# Patient Record
Sex: MALE | Race: WHITE | Employment: FULL TIME | URBAN - METROPOLITAN AREA
[De-identification: names, ages, dates, MRNs, and addresses within clinical notes are randomized per-mention and may not be internally consistent; named-entity substitution may affect disease eponyms.]

---

## 2017-07-13 ENCOUNTER — HOSPITAL ENCOUNTER (EMERGENCY)
Age: 22
Discharge: HOME | End: 2017-07-13
Attending: EMERGENCY MEDICINE | Admitting: EMERGENCY MEDICINE
Payer: SELF-PAY

## 2017-07-13 VITALS
HEART RATE: 96 BPM | SYSTOLIC BLOOD PRESSURE: 131 MMHG | TEMPERATURE: 98.7 F | WEIGHT: 185 LBS | DIASTOLIC BLOOD PRESSURE: 81 MMHG | BODY MASS INDEX: 25.06 KG/M2 | RESPIRATION RATE: 23 BRPM | HEIGHT: 72 IN | OXYGEN SATURATION: 99 %

## 2017-07-13 DIAGNOSIS — F41.0 PANIC ATTACK: Primary | ICD-10-CM

## 2017-07-13 PROCEDURE — 99285 EMERGENCY DEPT VISIT HI MDM: CPT

## 2017-07-13 PROCEDURE — 93005 ELECTROCARDIOGRAM TRACING: CPT

## 2017-07-13 NOTE — ED PROVIDER NOTES
HPI Comments: 6:05 PM Jose J Garces is a 22 y.o. male with a hx of panic attacks who presents to the ED via EMS for evaluation of an anxiety attack that occurred today. The pt is c/o of hyperventilation and CP. He explains that he had a disagreement with his wife that triggered his attack. He states that he has never talked to a psychiatrist or counselor about his anxiety before. He notes that his anxiety leads to further disagreements and that he would like help; the pt has anxiety attacks weekly. The pt denies exertional CP, current SOB and CP, SI, HI, hallucinations, abd pain and h/o bipolar disorder. No other complaints or concerns voiced at this time. PCP:  None      The history is provided by the patient. Past Medical History:   Diagnosis Date    Panic attack        History reviewed. No pertinent surgical history. History reviewed. No pertinent family history. Social History     Social History    Marital status:      Spouse name: N/A    Number of children: N/A    Years of education: N/A     Occupational History    Not on file. Social History Main Topics    Smoking status: Current Some Day Smoker    Smokeless tobacco: Never Used      Comment: patient states once in a while he will have 1 cigarette    Alcohol use Yes      Comment: occasionally    Drug use: No    Sexual activity: Yes     Other Topics Concern    Not on file     Social History Narrative    No narrative on file         ALLERGIES: Review of patient's allergies indicates no known allergies. Review of Systems   Constitutional: Negative for diaphoresis and fever. HENT: Negative for ear pain, rhinorrhea and trouble swallowing. Eyes: Negative for visual disturbance. Respiratory: Positive for shortness of breath. Negative for cough. Cardiovascular: Positive for chest pain. Negative for leg swelling. Gastrointestinal: Negative for abdominal pain, blood in stool, diarrhea, nausea and vomiting. Genitourinary: Negative for difficulty urinating, flank pain and hematuria. Musculoskeletal: Negative for back pain and neck pain. Skin: Negative for rash. Neurological: Negative for dizziness, weakness, numbness and headaches. Hematological: Negative. Psychiatric/Behavioral: Negative. Negative for hallucinations and suicidal ideas. All other systems reviewed and are negative. Vitals:    07/13/17 1740 07/13/17 1750 07/13/17 1810 07/13/17 1820   BP: 140/68 146/69 131/73 131/81   Pulse: (!) 104 98 95 96   Resp: 18 18 24 23   Temp:       SpO2: 99% 100% 100% 99%   Weight:       Height:                Physical Exam   Constitutional: He is oriented to person, place, and time. He appears well-developed and well-nourished. No distress. HENT:   Head: Normocephalic and atraumatic. Right Ear: External ear normal.   Left Ear: External ear normal.   Nose: Nose normal.   Mouth/Throat: Oropharynx is clear and moist.   Eyes: Conjunctivae and EOM are normal. Pupils are equal, round, and reactive to light. No scleral icterus. Neck: Normal range of motion. Neck supple. No JVD present. No tracheal deviation present. No thyromegaly present. Cardiovascular: Normal rate, regular rhythm, normal heart sounds and intact distal pulses. Exam reveals no gallop and no friction rub. No murmur heard. Pulmonary/Chest: Effort normal and breath sounds normal. He exhibits no tenderness. Abdominal: Soft. Bowel sounds are normal. He exhibits no distension. There is no tenderness. There is no rebound and no guarding. Musculoskeletal: Normal range of motion. He exhibits no edema or tenderness. Lymphadenopathy:     He has no cervical adenopathy. Neurological: He is alert and oriented to person, place, and time. No cranial nerve deficit. Coordination normal.   No sensory loss, Gait normal, Motor 5/5   Skin: Skin is warm and dry. Psychiatric: He has a normal mood and affect.  His behavior is normal. Judgment and thought content normal.   Nursing note and vitals reviewed. MDM  Number of Diagnoses or Management Options  Panic attack:   Diagnosis management comments: Pt w/ panic attack, non exertional symptoms. Pt needs out patient follow up. No SI, HI, or AH. ED Course       Procedures    Vitals:  Patient Vitals for the past 12 hrs:   Temp Pulse Resp BP SpO2   07/13/17 1820 - 96 23 131/81 99 %   07/13/17 1810 - 95 24 131/73 100 %   07/13/17 1750 - 98 18 146/69 100 %   07/13/17 1740 - (!) 104 18 140/68 99 %   07/13/17 1730 - (!) 102 20 139/74 98 %   07/13/17 1705 98.7 °F (37.1 °C) 88 21 138/90 97 %   07/13/17 1700 - 91 15 138/70 96 %   99% on RA, indicating adequate oxygenation. Medications ordered:   Medications - No data to display      Lab findings:  Recent Results (from the past 12 hour(s))   EKG, 12 LEAD, INITIAL    Collection Time: 07/13/17  5:01 PM   Result Value Ref Range    Ventricular Rate 83 BPM    Atrial Rate 83 BPM    P-R Interval 138 ms    QRS Duration 90 ms    Q-T Interval 344 ms    QTC Calculation (Bezet) 404 ms    Calculated P Axis 40 degrees    Calculated R Axis 26 degrees    Calculated T Axis 21 degrees    Diagnosis       Normal sinus rhythm  Normal ECG  No previous ECGs available         EKG interpretation by ED Physician:  1704 rate 83 bpm, normal interval and axis, no ST abnormalities         X-Ray, CT or other radiology findings or impressions:  No orders to display           Disposition:  Diagnosis:   1. Panic attack        Disposition: Discharge     Follow-up Information     Follow up With Details Comments [address] [facility] Call in 1 day Follow Up From Emergency [id number]  [phone]           There are no discharge medications for this patient.         SCRIBE ATTESTATION STATEMENT  Documented by: Riccardo Hairston scribing for, and in the presence of, Yoly Fields MD 07/13/17 6:50 PM     Signed by: Amber Oquendo Daniel Grijalva, 07/13/17 6:50 PM     PROVIDER ATTESTATION STATEMENT  I personally performed the services described in the documentation, reviewed the documentation, as recorded by the scribe in my presence, and it accurately and completely records my words and actions.   Lex Carter MD

## 2017-07-13 NOTE — DISCHARGE INSTRUCTIONS
Panic Attacks: Care Instructions  Your Care Instructions  During a panic attack, you may have a feeling of intense fear or terror, trouble breathing, chest pain or tightness, heartbeat changes, dizziness, sweating, and shaking. A panic attack starts suddenly and usually lasts from 5 to 20 minutes but may last even longer. You have the most anxiety about 10 minutes after the attack starts. An attack can begin with a stressful event, or it can happen without a cause. Although panic attacks can cause scary symptoms, you can learn to manage them with self-care, counseling, and medicine. Follow-up care is a key part of your treatment and safety. Be sure to make and go to all appointments, and call your doctor if you are having problems. It's also a good idea to know your test results and keep a list of the medicines you take. How can you care for yourself at home? · Take your medicine exactly as directed. Call your doctor if you think you are having a problem with your medicine. · Go to your counseling sessions and follow-up appointments. · Recognize and accept your anxiety. Then, when you are in a situation that makes you anxious, say to yourself, \"This is not an emergency. I feel uncomfortable, but I am not in danger. I can keep going even if I feel anxious. \"  · Be kind to your body:  ¨ Relieve tension with exercise or a massage. ¨ Get enough rest.  ¨ Avoid alcohol, caffeine, nicotine, and illegal drugs. They can increase your anxiety level, cause sleep problems, or trigger a panic attack. ¨ Learn and do relaxation techniques. See below for more about these techniques. · Engage your mind. Get out and do something you enjoy. Go to a funny movie, or take a walk or hike. Plan your day. Having too much or too little to do can make you anxious. · Keep a record of your symptoms. Discuss your fears with a good friend or family member, or join a support group for people with similar problems.  Talking to others sometimes relieves stress. · Get involved in social groups, or volunteer to help others. Being alone sometimes makes things seem worse than they are. · Get at least 30 minutes of exercise on most days of the week to relieve stress. Walking is a good choice. You also may want to do other activities, such as running, swimming, cycling, or playing tennis or team sports. Relaxation techniques  Do relaxation exercises for 10 to 20 minutes a day. You can play soothing, relaxing music while you do them, if you wish. · Tell others in your house that you are going to do your relaxation exercises. Ask them not to disturb you. · Find a comfortable place, away from all distractions and noise. · Lie down on your back, or sit with your back straight. · Focus on your breathing. Make it slow and steady. · Breathe in through your nose. Breathe out through either your nose or mouth. · Breathe deeply, filling up the area between your navel and your rib cage. Breathe so that your belly goes up and down. · Do not hold your breath. · Breathe like this for 5 to 10 minutes. Notice the feeling of calmness throughout your whole body. As you continue to breathe slowly and deeply, relax by doing the following for another 5 to 10 minutes:  · Tighten and relax each muscle group in your body. You can begin at your toes and work your way up to your head. · Imagine your muscle groups relaxing and becoming heavy. · Empty your mind of all thoughts. · Let yourself relax more and more deeply. · Become aware of the state of calmness that surrounds you. · When your relaxation time is over, you can bring yourself back to alertness by moving your fingers and toes and then your hands and feet and then stretching and moving your entire body. Sometimes people fall asleep during relaxation, but they usually wake up shortly afterward.   · Always give yourself time to return to full alertness before you drive a car or do anything that might cause an accident if you are not fully alert. Never play a relaxation tape while driving a car. When should you call for help? Call 911 anytime you think you may need emergency care. For example, call if:  · You feel you cannot stop from hurting yourself or someone else. Watch closely for changes in your health, and be sure to contact your doctor if:  · Your panic attacks get worse. · You have new or different anxiety. · You are not getting better as expected. Where can you learn more? Go to http://murtaza-toya.info/. Enter H601 in the search box to learn more about \"Panic Attacks: Care Instructions. \"  Current as of: July 26, 2016  Content Version: 11.3  © 0504-5769 [employer]. Care instructions adapted under license by [employer] (which disclaims liability or warranty for this information). If you have questions about a medical condition or this instruction, always ask your healthcare professional. [place] [address] any warranty or liability for your use of this information.

## 2017-07-13 NOTE — ED TRIAGE NOTES
Patient arrived by EMS with chief c/o panic attack after wife and and him had an argument. Per EMS argument was just raised voices with no violence. Per EMS patient has HX of panic attacks and HX SI but patient denies SI and HI at this time. Patient was diaphoretic and drowsy upon arrival. Patient is alert and oriented at this time.

## 2017-07-14 LAB
ATRIAL RATE: 83 BPM
CALCULATED P AXIS, ECG09: 40 DEGREES
CALCULATED R AXIS, ECG10: 26 DEGREES
CALCULATED T AXIS, ECG11: 21 DEGREES
DIAGNOSIS, 93000: NORMAL
P-R INTERVAL, ECG05: 138 MS
Q-T INTERVAL, ECG07: 344 MS
QRS DURATION, ECG06: 90 MS
QTC CALCULATION (BEZET), ECG08: 404 MS
VENTRICULAR RATE, ECG03: 83 BPM